# Patient Record
(demographics unavailable — no encounter records)

---

## 2024-11-15 NOTE — HISTORY OF PRESENT ILLNESS
[FreeTextEntry1] : VASYL CHEN  is a 70 year old  F She was last seen in January.  In the interim she had blood work performed.  March 2024 potassium 4.3 creatinine 0.8  total cholesterol 181 LDL 92 follow-up echocardiogram to monitor valvular heart disease LV size and function.  Last blood work with improvement in cholesterol from LDL 1 75-92 monitor blood work.  Clinical follow-up after requested echocardiogram and blood work recent ER record.  Headache eye pressure paresthesias blood work hemoglobin 12.7 potassium 4.7 creatinine 0.9 given IV fluids labs checked unremarkable head CT outside EKG sinus bradycardia head CT describes nonspecific white matter changes clinical follow-up after requested echocardiogram and blood work on my exam her systolic blood pressure is 145.  I have increased her dose of hydrochlorothiazide.  She is following with endocrinology regarding adjustment of her thyroid.  Follow-up echo and stress echo.  She has had symptoms of left-sided arm numbness. History of hypertension. Asymptomatic. Detected at routine monitoring. Night sweats and peripheral edema on CCB intolerance to the losartan with paresthesia Follows with endo thyroid replacement. Family history of hypertension. Takes HCT  Previously told of a heart murmur. Noted to have aortic regurgitation.  Pror intolerance to Lipitor due to leg cramping "Mother went downhill after starting statin therapy" started PCSK9 monoclonal antibody. After she had symptoms of paresthesias. Also symptoms of dizziness. Stopped the medication. There was prior confusion regarding her statin dose. She now has been taking it regularly. She is intolerant of higher dose of statin.   Baseline active. She does weights and walks. No limitations. She underwent evaluation at [facility]. This was unremarkable. Plan for surveillance monitoring. She received gel injections in her knee. She is active riding and walking.  echocardiogram has normal left ventricular function mild mitral regurgitation moderate aortic regurgitation.  November 2023 potassium 4.3 creatinine 0.8  carotid Doppler mild atherosclerosis December 2021 abdominal ultrasound no aneurysm mild atherosclerosis Prior cardiac catheterization April 2021 normal coronary arteries recent labs with elevated LP(a) >200 coronary calcium score of 53 outside nuclear stress test in November 2017 which demonstrated normal myocardial perfusion Monitor average heart rate of 70 sinus rhythm premature beats stress echocardiogram exercised 9 minutes EKG changes normal augmentation of LV systolic function  Echo report reviewed. Monitor valvular heart disease LV size and function. Follow-up blood work has been requested since reinitiation of statin with Zetia.  antihypertensive. Monitor thyroid replacement.  coronary artery calcification, hyperlipidemia, statin intolerance, elevated LP(a) and family history Discussed further lipid-lowering. Discussed addition of Zetia. Possible bempedoic acid. Possible retrial of monoclonal antibody or future use of inclisiran. Monitor blood pressure. Monitor valvular heart disease  VHD asx monitor ai/lv size amd fxn does not req sbe ppx  HTN cont hct Low salt diet Intolerance to losartan/amlodipine. reviewed goals  Family history of hyperlipidemia Coronary artery calcification HL elevated lpa statin intol discussed novel nonstatin lipid-lowering therapies Instructed to call if adverse effects.  Adjunctive dietary measures: regular aerobic exercise, low-sodium diet, limit NSAIDs Monitor blood pressure at home. Call if abnormal BP readings Monitor thyroid replacement.

## 2024-11-15 NOTE — HISTORY OF PRESENT ILLNESS
[FreeTextEntry1] : VASYL CHEN  is a 70 year old  F She was last seen in January.  In the interim she had blood work performed.  March 2024 potassium 4.3 creatinine 0.8  total cholesterol 181 LDL 92 follow-up echocardiogram to monitor valvular heart disease LV size and function.  Last blood work with improvement in cholesterol from LDL 1 75-92 monitor blood work.  Clinical follow-up after requested echocardiogram and blood work recent ER record.  Headache eye pressure paresthesias blood work hemoglobin 12.7 potassium 4.7 creatinine 0.9 given IV fluids labs checked unremarkable head CT outside EKG sinus bradycardia head CT describes nonspecific white matter changes clinical follow-up after requested echocardiogram and blood work on my exam her systolic blood pressure is 145.  I have increased her dose of hydrochlorothiazide.  She is following with endocrinology regarding adjustment of her thyroid.  Follow-up echo and stress echo.  She has had symptoms of left-sided arm numbness. History of hypertension. Asymptomatic. Detected at routine monitoring. Night sweats and peripheral edema on CCB intolerance to the losartan with paresthesia Follows with endo thyroid replacement. Family history of hypertension. Takes HCT  Previously told of a heart murmur. Noted to have aortic regurgitation.  Pror intolerance to Lipitor due to leg cramping "Mother went downhill after starting statin therapy" started PCSK9 monoclonal antibody. After she had symptoms of paresthesias. Also symptoms of dizziness. Stopped the medication. There was prior confusion regarding her statin dose. She now has been taking it regularly. She is intolerant of higher dose of statin.   Baseline active. She does weights and walks. No limitations. She underwent evaluation at [facility]. This was unremarkable. Plan for surveillance monitoring. She received gel injections in her knee. She is active riding and walking.  echocardiogram has normal left ventricular function mild mitral regurgitation moderate aortic regurgitation.  November 2023 potassium 4.3 creatinine 0.8  carotid Doppler mild atherosclerosis December 2021 abdominal ultrasound no aneurysm mild atherosclerosis Prior cardiac catheterization April 2021 normal coronary arteries recent labs with elevated LP(a) >200 coronary calcium score of 53 outside nuclear stress test in November 2017 which demonstrated normal myocardial perfusion Monitor average heart rate of 70 sinus rhythm premature beats stress echocardiogram exercised 9 minutes EKG changes normal augmentation of LV systolic function  Echo report reviewed. Monitor valvular heart disease LV size and function. Follow-up blood work has been requested since reinitiation of statin with Zetia.  antihypertensive. Monitor thyroid replacement.  coronary artery calcification, hyperlipidemia, statin intolerance, elevated LP(a) and family history Discussed further lipid-lowering. Discussed addition of Zetia. Possible bempedoic acid. Possible retrial of monoclonal antibody or future use of inclisiran. Monitor blood pressure. Monitor valvular heart disease  VHD asx monitor ai/lv size amd fxn does not req sbe ppx  HTN cont hct Low salt diet Intolerance to losartan/amlodipine. reviewed goals  Family history of hyperlipidemia Coronary artery calcification HL elevated lpa statin intol discussed novel nonstatin lipid-lowering therapies Instructed to call if adverse effects.  Adjunctive dietary measures: regular aerobic exercise, low-sodium diet, limit NSAIDs Monitor blood pressure at home. Call if abnormal BP readings Monitor thyroid replacement.

## 2024-12-17 NOTE — HISTORY OF PRESENT ILLNESS
[FreeTextEntry1] : VASLY CHEN  is a 70 year old  F Last seen a month ago.  At that time ultrasound testing was recommended.  Echocardiogram is normal left ventricular function mild aortic stenosis moderate aortic regurgitation monitor valvular heart disease LV size and function.  Stress echo December 2024 no significant ischemia recent echocardiogram and stress test reviewed blood work November 2024 hemoglobin 13.2 potassium 4.1 creatinine 0.8  total cholesterol 220 A1c 5.0 CRP 0.1  BNP TSH within normal limits recent labs and ultrasound imaging reviewed Recent ultrasound and stress test reviewed.  Recent blood work reviewed.  She may have an issue with her pancreas.  She previously had evaluation at [facility].  Yesterday she had an MRI.  She is following with Dr. Ruben Felder and Dr. Estrada.  Outside MRI and notes have been requested.  On my exam her blood pressure is 122/72.  She is now back on a low-dose statin in addition to the Zetia.  History of hypertension. Asymptomatic. Detected at routine monitoring. Night sweats and peripheral edema on CCB intolerance to the losartan with paresthesia Family history of hypertension. Takes HCT  Previously told of a heart murmur. Noted to have aortic regurgitation.  Pror intolerance to Lipitor due to leg cramping "Mother went downhill after starting statin therapy" started PCSK9 monoclonal antibody. After she had symptoms of paresthesias. Also symptoms of dizziness. Stopped the medication.  Baseline active. She does weights and walks. No limitations. She underwent evaluation at [facility]. This was unremarkable. Plan for surveillance monitoring. She received gel injections in her knee. She is active riding and walking. She is following with endocrinology regarding adjustment of her thyroid.   March 2024 potassium 4.3 creatinine 0.8  total cholesterol 181 LDL 92   recent ER record. Headache eye pressure paresthesias blood work hemoglobin 12.7 potassium 4.7 creatinine 0.9 given IV fluids labs checked unremarkable head CT outside EKG sinus bradycardia head CT describes nonspecific white matter changes  November 2023 potassium 4.3 creatinine 0.8  carotid Doppler mild atherosclerosis December 2021 abdominal ultrasound no aneurysm mild atherosclerosis Prior cardiac catheterization April 2021 normal coronary arteries recent labs with elevated LP(a) >200 coronary calcium score of 53 outside nuclear stress test in November 2017 which demonstrated normal myocardial perfusion Monitor average heart rate of 70 sinus rhythm premature beats  coronary artery calcification, hyperlipidemia, statin intolerance, elevated LP(a) and family history Discussed further lipid-lowering. Discussed addition of Zetia. Possible bempedoic acid. Possible retrial of monoclonal antibody or future use of inclisiran. Monitor thyroid replacement.  VHD monitor ai/lv size and fxn does not req sbe ppx  HTN cont hct Low salt diet Intolerance to losartan/amlodipine. reviewed goals  Family history of hyperlipidemia Coronary artery calcification HL elevated lpa statin intol discussed novel nonstatin lipid-lowering therapies Instructed to call if adverse effects.  Adjunctive dietary measures: regular aerobic exercise, low-sodium diet, limit NSAIDs Monitor blood pressure at home. Call if abnormal BP readings Monitor thyroid replacement.

## 2024-12-17 NOTE — HISTORY OF PRESENT ILLNESS
[FreeTextEntry1] : VASYL CHEN  is a 70 year old  F Last seen a month ago.  At that time ultrasound testing was recommended.  Echocardiogram is normal left ventricular function mild aortic stenosis moderate aortic regurgitation monitor valvular heart disease LV size and function.  Stress echo December 2024 no significant ischemia recent echocardiogram and stress test reviewed blood work November 2024 hemoglobin 13.2 potassium 4.1 creatinine 0.8  total cholesterol 220 A1c 5.0 CRP 0.1  BNP TSH within normal limits recent labs and ultrasound imaging reviewed Recent ultrasound and stress test reviewed.  Recent blood work reviewed.  She may have an issue with her pancreas.  She previously had evaluation at [facility].  Yesterday she had an MRI.  She is following with Dr. Ruben Felder and Dr. Estrada.  Outside MRI and notes have been requested.  On my exam her blood pressure is 122/72.  She is now back on a low-dose statin in addition to the Zetia.  History of hypertension. Asymptomatic. Detected at routine monitoring. Night sweats and peripheral edema on CCB intolerance to the losartan with paresthesia Family history of hypertension. Takes HCT  Previously told of a heart murmur. Noted to have aortic regurgitation.  Pror intolerance to Lipitor due to leg cramping "Mother went downhill after starting statin therapy" started PCSK9 monoclonal antibody. After she had symptoms of paresthesias. Also symptoms of dizziness. Stopped the medication.  Baseline active. She does weights and walks. No limitations. She underwent evaluation at [facility]. This was unremarkable. Plan for surveillance monitoring. She received gel injections in her knee. She is active riding and walking. She is following with endocrinology regarding adjustment of her thyroid.   March 2024 potassium 4.3 creatinine 0.8  total cholesterol 181 LDL 92   recent ER record. Headache eye pressure paresthesias blood work hemoglobin 12.7 potassium 4.7 creatinine 0.9 given IV fluids labs checked unremarkable head CT outside EKG sinus bradycardia head CT describes nonspecific white matter changes  November 2023 potassium 4.3 creatinine 0.8  carotid Doppler mild atherosclerosis December 2021 abdominal ultrasound no aneurysm mild atherosclerosis Prior cardiac catheterization April 2021 normal coronary arteries recent labs with elevated LP(a) >200 coronary calcium score of 53 outside nuclear stress test in November 2017 which demonstrated normal myocardial perfusion Monitor average heart rate of 70 sinus rhythm premature beats  coronary artery calcification, hyperlipidemia, statin intolerance, elevated LP(a) and family history Discussed further lipid-lowering. Discussed addition of Zetia. Possible bempedoic acid. Possible retrial of monoclonal antibody or future use of inclisiran. Monitor thyroid replacement.  VHD monitor ai/lv size and fxn does not req sbe ppx  HTN cont hct Low salt diet Intolerance to losartan/amlodipine. reviewed goals  Family history of hyperlipidemia Coronary artery calcification HL elevated lpa statin intol discussed novel nonstatin lipid-lowering therapies Instructed to call if adverse effects.  Adjunctive dietary measures: regular aerobic exercise, low-sodium diet, limit NSAIDs Monitor blood pressure at home. Call if abnormal BP readings Monitor thyroid replacement.

## 2025-03-24 NOTE — HISTORY OF PRESENT ILLNESS
[FreeTextEntry1] : 70-year-old right-handed female with a past medical history of hypertension hypercholesterolemia, prolactinoma and age 20, status post thyroidectomy at age 50 currently on Synthroid presents with an evaluation for headache.  According to the patient, her headaches began immediately following the thyroidectomy occurring intermittently 3-4 times per every 2-week.  And then 1 month without headache.  There is no rhyme or reason to these headaches and despite being requested, she has not completed a headache diary.  When she gets the attacks she takes Tylenol up to 6 to 8 tablets in a 4-day.  The headaches are described as a throbbing sensation primarily on the right side but can occur on the left side of her head associated with photophobia and phonophobia without nausea or vomiting.  Patient also notes seeing "sparkles" in her vision when the headaches are severe which occur once every fourth headache requiring her to.  The patient reports that her sleep pattern is not structured.  However, when she has a headache, she is able to fall asleep and stay asleep.  The patient denies snoring.  The patient is unaware of specific triggers that may cause her headache.  Family history noncontributory.  Allergies none known by patient.  Social history  no children.  Denies tobacco alcohol cannabis use.  She is a retired operating room nurse.  More recently, she works 2 hours/day as a .  She enjoys this job.

## 2025-03-24 NOTE — ASSESSMENT
[FreeTextEntry1] : This is a case of a 70-year-old right-handed female with a history of hypertension, hypercholesteremia, prolactinoma, hypothyroidism who began complaining of headaches severely following the thyroidectomy  The patient describes headaches that occur intermittently 3-4 times up to a 2-week period and then headache free for a month.  She responds adequately to Tylenol up to 6 to 8 tablets in a 4-day.  Of note, her prolactinoma began at age 20 was chemically treated and was followed for approximately 45 years last seen by endocrinology in 2020.  Therefore, I do not think that further monitoring or workup in this area is needed.  Prior to considering any treatment option, I strongly recommended the patient maintain a daily diary and provide her instructions on how to do so.  Patient agreed with my assessment and plan and will follow-up in approximately 2 to 3 months.  I spent 45 minutes  reviewing history, medical records, clinical evaluation, management, discussion of plan.

## 2025-06-10 NOTE — HISTORY OF PRESENT ILLNESS
[FreeTextEntry1] : VASYL CHEN  is a 71 year old  F History of hypertension. Asymptomatic. Detected at routine monitoring. Night sweats and peripheral edema on CCB intolerance to losartan with paresthesia Family history of hypertension. Takes HCT  Last visit December 2024 blood work primary physician December 24 hemoglobin 12.3 creatinine 0.9 there is no lipid profile available there is a note in the system from neurology seen for headaches MRI with pituitary adenoma follow-up with neurology as scheduled follow-up echo.  Follow-up blood work.  She returned home late last night from extended travel.  On repeat examination her blood pressure is borderline.  She reports typically her blood pressure is improved at home.  She likes to minimize her medications.  The pancreatic workup was unremarkable.  Follow-up blood work.  Follow-up echocardiogram.  Previously told of a heart murmur. Noted to have aortic regurgitation.  Pror intolerance to Lipitor due to leg cramping "Mother went downhill after starting statin therapy" started PCSK9 monoclonal antibody. After she had symptoms of paresthesias. Also symptoms of dizziness. Stopped the medication.  She may have an issue with her pancreas. She previously had evaluation at [facility].She is following with Dr. Swanson and Dr. SYKES She is following with endocrinology regarding adjustment of her thyroid.   Echocardiogram is normal left ventricular function mild aortic stenosis moderate aortic regurgitation  Stress echo December 2024 no significant ischemia  blood work November 2024 hemoglobin 13.2 potassium 4.1 creatinine 0.8  total cholesterol 220 A1c 5.0 CRP 0.1  BNP TSH within normal limits  [facility] 2024 potassium 4.3 creatinine 0.8  total cholesterol 181 LDL 92 November 2023 potassium 4.3 creatinine 0.8   carotid Doppler mild atherosclerosis December 2021 abdominal ultrasound no aneurysm mild atherosclerosis Prior cardiac catheterization April 2021 normal coronary arteries  labs with elevated LP(a) >200 coronary calcium score of 53  outside nuclear stress test in November 2017 which demonstrated normal myocardial perfusion Monitor average heart rate of 70 sinus rhythm premature beats  coronary artery calcification, hyperlipidemia, statin intolerance, elevated LP(a) and family history Discussed further lipid-lowering. Discussed addition of Zetia. Possible bempedoic acid. Possible retrial of monoclonal antibody or future use of inclisiran.  VHD monitor ai/lv size and fxn does not req sbe ppx  HTN cont hct Low salt diet Intolerance to losartan/amlodipine. reviewed goals  Family history of hyperlipidemia Coronary artery calcification HL elevated lpa statin intol discussed novel nonstatin lipid-lowering therapies Instructed to call if adverse effects.  Adjunctive dietary measures: regular aerobic exercise, low-sodium diet, limit NSAIDs Monitor blood pressure at home. Call if abnormal BP readings Monitor thyroid replacement.